# Patient Record
Sex: MALE | Race: BLACK OR AFRICAN AMERICAN | NOT HISPANIC OR LATINO | URBAN - METROPOLITAN AREA
[De-identification: names, ages, dates, MRNs, and addresses within clinical notes are randomized per-mention and may not be internally consistent; named-entity substitution may affect disease eponyms.]

---

## 2019-11-14 ENCOUNTER — APPOINTMENT (RX ONLY)
Dept: URBAN - METROPOLITAN AREA CLINIC 1 | Facility: CLINIC | Age: 24
Setting detail: DERMATOLOGY
End: 2019-11-14

## 2019-11-14 DIAGNOSIS — L30.0 NUMMULAR DERMATITIS: ICD-10-CM

## 2019-11-14 DIAGNOSIS — L30.8 OTHER SPECIFIED DERMATITIS: ICD-10-CM

## 2019-11-14 PROCEDURE — ? COUNSELING

## 2019-11-14 PROCEDURE — ? TREATMENT REGIMEN

## 2019-11-14 PROCEDURE — ? PRESCRIPTION

## 2019-11-14 PROCEDURE — 99202 OFFICE O/P NEW SF 15 MIN: CPT

## 2019-11-14 RX ORDER — TRIAMCINOLONE ACETONIDE 1 MG/G
OINTMENT TOPICAL
Qty: 1 | Refills: 0 | Status: ERX | COMMUNITY
Start: 2019-11-14

## 2019-11-14 RX ORDER — CEPHALEXIN 250 MG/1
CAPSULE ORAL
Qty: 42 | Refills: 0 | Status: ERX | COMMUNITY
Start: 2019-11-14

## 2019-11-14 RX ORDER — MUPIROCIN 20 MG/G
OINTMENT TOPICAL
Qty: 1 | Refills: 0 | Status: ERX | COMMUNITY
Start: 2019-11-14

## 2019-11-14 RX ADMIN — CEPHALEXIN: 250 CAPSULE ORAL at 21:48

## 2019-11-14 RX ADMIN — TRIAMCINOLONE ACETONIDE: 1 OINTMENT TOPICAL at 21:45

## 2019-11-14 RX ADMIN — MUPIROCIN: 20 OINTMENT TOPICAL at 21:44

## 2019-12-19 ENCOUNTER — APPOINTMENT (RX ONLY)
Dept: URBAN - METROPOLITAN AREA CLINIC 1 | Facility: CLINIC | Age: 24
Setting detail: DERMATOLOGY
End: 2019-12-19

## 2019-12-19 DIAGNOSIS — L30.0 NUMMULAR DERMATITIS: ICD-10-CM

## 2019-12-19 DIAGNOSIS — L30.8 OTHER SPECIFIED DERMATITIS: ICD-10-CM

## 2019-12-19 DIAGNOSIS — L28.0 LICHEN SIMPLEX CHRONICUS: ICD-10-CM

## 2019-12-19 PROCEDURE — ? TREATMENT REGIMEN

## 2019-12-19 PROCEDURE — ? INTRALESIONAL KENALOG

## 2019-12-19 PROCEDURE — ? COUNSELING

## 2019-12-19 PROCEDURE — 11901 INJECT SKIN LESIONS >7: CPT

## 2019-12-19 PROCEDURE — 99213 OFFICE O/P EST LOW 20 MIN: CPT | Mod: 25

## 2019-12-19 ASSESSMENT — LOCATION SIMPLE DESCRIPTION DERM
LOCATION SIMPLE: LEFT FOREARM
LOCATION SIMPLE: LEFT WRIST

## 2019-12-19 ASSESSMENT — LOCATION DETAILED DESCRIPTION DERM
LOCATION DETAILED: LEFT DORSAL WRIST
LOCATION DETAILED: LEFT PROXIMAL DORSAL FOREARM
LOCATION DETAILED: LEFT DISTAL DORSAL FOREARM

## 2019-12-19 ASSESSMENT — LOCATION ZONE DERM: LOCATION ZONE: ARM

## 2019-12-19 NOTE — PROCEDURE: TREATMENT REGIMEN
Detail Level: Zone
Continue Regimen: Keflex 250 bid x3 weeks (patient has one week left) \\nMupirocin oint qam to arm \\nTriamcinolone oint to arm QHS
Continue Regimen: TAC oint to ears bid

## 2019-12-19 NOTE — PROCEDURE: INTRALESIONAL KENALOG
Concentration Of Solution Injected (Mg/Ml): 2.5
Detail Level: Detailed
Medical Necessity Clause: This procedure was medically necessary because the lesions that were treated were:
Consent: The risks of atrophy were reviewed with the patient.
Kenalog Preparation: Kenalog
X Size Of Lesion In Cm (Optional): 0
Total Volume Injected (Ccs- Only Use Numbers And Decimals): 0.5
Treatment Number (Optional): 1
Include Z78.9 (Other Specified Conditions Influencing Health Status) As An Associated Diagnosis?: No

## 2021-07-14 NOTE — PROCEDURE: TREATMENT REGIMEN
I was in the immediate presence of the student and directed the student's performance of the services. I am responsible for all treatment, assessment, documentation and billing rendered for this patient.Gabbi Mckeon,  PT    PT IRP Treatment    Primary Rehabilitation Diagnosis: CVA  Expected Discharge Date: 7/23/2021  Planned Discharge Destination: Home (d/c 7/23)    SUBJECTIVE: Subjective: Pt agreeable to session; \"My legs hurt when I start walking\" (07/14/21 0700)  Subjective/Objective Comments: Pt sitting in recliner with alarms intact and call light within reach. RN (Khalif) aware of therapy session. (07/14/21 0700)    OBJECTIVE:  Precautions  Other Precautions: Fall risk (07/14/21 0700)    See below for current functional status overview.  See PT flowsheet for full details regarding the PT therapy provided.    ASSESSMENT:   Treatment today focused on LLE balance/coordination retraining, gait without WW.  Patient is demonstrating fair progress supported by willingness to participate in therapy and increased ambulation distance without assistive device. Patient however, limited at this time by imbalance, limited coordination, limited activity tolerance due to bilateral LE/calf pain during mobility skills, c/o diarrhea in the pm,.  Patient will benefit from further skilled PT  for continued training with functional mobility to help the patient meet goal of safe return home with spouse. Pt missed 30 mins of scheduled time in the pm due to c/o diarrhea, fatigue and thus needing to rest in bed. Pt's spouse here to visit and aware of status.    PT Identified Barriers to Discharge: L sided weakness, ataxia, imbalance, BLE fatigue     This patient participated in all scheduled physical therapy time with this therapist today.am, but missed 30 mins in the pm due to c/o diarrhea and fatigue.    EDUCATION:   On this date, education was provided to patient regarding  bed mobility, transfers, ambulation 
and stairs  The response to education was/were: Verbalizes understanding, Demonstrates understanding and Needs reinforcement    PLAN:   Continue skilled PT, including the following Treatment/Interventions: Functional transfer training;Strengthening;Endurance training;Patient/Family training;Equipment eval/education;Bed mobility;Gait training;Stairs retraining;Safety Education (07/12/21 1300)   PT Frequency: 7 days/week (07/14/21 0700), Frequency Comments: 90 mins at least 5 days/ week (07/14/21 0700)    Treatment Plan for Next Session: Balance/coordination with LLE, progress gait with vs without assistive device, LLE strengthening  Additional Plan Considerations: 7/20 re-do ABC, 6MWT, 10MWT  Plan Comments: Nataliya otto 7/16    RECOMMENDATIONS FOR DISCHARGE:  Recommendation for Discharge: PT WI: Intensive therapy program    PT/OT Mobility Equipment for Discharge: Pt has WW for mother-in-law but does not know if it can be raised to his height (07/14/21 0700)  PT/OT ADL Equipment for Discharge: To be further assessed. Pt owns no AE. (07/12/21 1300)      FUNCTIONAL DATA OVERVIEW LAST 24 HOURS     Transfers  Transfers  Sit to Stand: Supervision (Supv) (07/14/21 0700)  Stand to Sit: Supervision (Supv) (07/14/21 0700)  Stand Pivot Transfers: Supervision (Supv) (07/14/21 0700)  Assistive Device/: 1 Person;Gait Belt (07/14/21 0700)  Transfer Comments 1: Supv for safety considerations (07/14/21 0700)    Gait  Gait  Gait Assistance: Supervision (Supv);Touching/Steadying Assistance (07/14/21 0700)  Assistive Device/: 1 Person;Gait Belt (07/14/21 0700)  Ambulation Distance (Feet): 150 Feet (07/14/21 0700)  Pattern: Decreased stance time L;Foot drag L;Ataxic (07/14/21 0700)  Ambulation Surface: Tile;Carpet (07/14/21 0700)  Gait Comments 1: Without AD, pt ambulated over tile and carpet with no gross LOB noted, but consistent unsteadiness; pt demonstrates lack of proprioceptive awareness and LE pain/fatigue 
(07/14/21 0700),      Stairs  Stairs Mobility  Number of Stairs: 4+4 (6\") (07/14/21 0700)  Stair Management Assistance: Minimal Assist (Min) (07/14/21 0700)  Stair Management Technique: Two rails;Step to pattern (07/14/21 0700)  Stairs Mobility Comments: Step to pattern for ascent/descent; up with R, down with L. Requries cues to clear L foot during ascent (07/14/21 0700)       Balance  Balance  Standing - Dynamic (eyes open): Minimal Assist (Min) (07/14/21 0700)  Balance Comments #1: Pt completed standing bilat steps into box with LUE support at parallel bars for balance/coordination/LUE strengthening; cues for LLE clearance (07/14/21 0700)  Balance Comments #2: In w/c, pt completed cone reaches x12 with varying heights and distances for core control, sitting balance, LUE coordination (07/14/21 0700)       
Initiate Treatment: Keflex 250 bid x3 weeks\\nMupirocin oint qam\\nTriamcinolone oint to arm QHS & to ears bid
Detail Level: Zone
Initiate Treatment: TAC oint to ears bid